# Patient Record
Sex: FEMALE | Race: WHITE | NOT HISPANIC OR LATINO | Employment: OTHER | URBAN - METROPOLITAN AREA
[De-identification: names, ages, dates, MRNs, and addresses within clinical notes are randomized per-mention and may not be internally consistent; named-entity substitution may affect disease eponyms.]

---

## 2019-07-25 ENCOUNTER — OFFICE VISIT (OUTPATIENT)
Dept: PODIATRY | Facility: CLINIC | Age: 59
End: 2019-07-25
Payer: COMMERCIAL

## 2019-07-25 DIAGNOSIS — M20.60 ACQUIRED DEFORMITY OF TOE, UNSPECIFIED LATERALITY: Primary | ICD-10-CM

## 2019-07-25 DIAGNOSIS — E11.9 CONTROLLED TYPE 2 DIABETES MELLITUS WITHOUT COMPLICATION, WITHOUT LONG-TERM CURRENT USE OF INSULIN: ICD-10-CM

## 2019-07-25 DIAGNOSIS — B35.1 ONYCHOMYCOSIS DUE TO DERMATOPHYTE: ICD-10-CM

## 2019-07-25 DIAGNOSIS — M79.675 PAIN IN TOES OF BOTH FEET: ICD-10-CM

## 2019-07-25 DIAGNOSIS — M79.674 PAIN IN TOES OF BOTH FEET: ICD-10-CM

## 2019-07-25 PROCEDURE — 99999 PR PBB SHADOW E&M-NEW PATIENT-LVL II: ICD-10-PCS | Mod: PBBFAC,,, | Performed by: PODIATRIST

## 2019-07-25 PROCEDURE — 99202 OFFICE O/P NEW SF 15 MIN: CPT | Mod: S$GLB,,, | Performed by: PODIATRIST

## 2019-07-25 PROCEDURE — 99202 PR OFFICE/OUTPT VISIT, NEW, LEVL II, 15-29 MIN: ICD-10-PCS | Mod: S$GLB,,, | Performed by: PODIATRIST

## 2019-07-25 PROCEDURE — 99999 PR PBB SHADOW E&M-NEW PATIENT-LVL II: CPT | Mod: PBBFAC,,, | Performed by: PODIATRIST

## 2019-07-25 RX ORDER — DICLOFENAC SODIUM 10 MG/G
2 GEL TOPICAL 3 TIMES DAILY
Qty: 100 G | Refills: 2 | Status: SHIPPED | OUTPATIENT
Start: 2019-07-25

## 2019-07-25 RX ORDER — GLIMEPIRIDE 2 MG/1
TABLET ORAL
COMMUNITY
Start: 2019-05-18

## 2019-07-25 RX ORDER — METFORMIN HYDROCHLORIDE 1000 MG/1
1000 TABLET ORAL 2 TIMES DAILY
Refills: 2 | COMMUNITY
Start: 2019-07-07

## 2019-07-27 VITALS — HEART RATE: 84 BPM | DIASTOLIC BLOOD PRESSURE: 72 MMHG | SYSTOLIC BLOOD PRESSURE: 111 MMHG

## 2019-07-27 NOTE — PROGRESS NOTES
Subjective:      Patient ID: Cuca Gilmore is a 59 y.o. female.    Chief Complaint: Nail Problem (arched toe nails, space between 2 & 3rd digit)  Patient presents with concern regarding space between 2nd and 3rd digits on both feet, pain on occasion and problems with nails on both feet.  States she is a type 2 diabetic with no complications.  No previous foot problems.  She has noticed discoloration and thickening of the 1st and 4th digit nails left foot and curvature of a few nails, 2nd toenail left foot most severe.  No previous history of infection.  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  Has been wearing wear open-toed shoes during the summer with the strap across the top that helps push the 2nd and 3rd digits together. Pain level       ROS  Constitutional    Pleasant, well-nourished, no distress, well oriented    Eyes         GLASSES    Cardiovascular          No chest pain, no shortness of breath,  no swelling in the extremities    Respiratory           No cough, no congestion     Musculoskeletal        No muscle aches, no arthralgias/joint pain    Endocrine          DIABETES        Objective:      Physical Exam   Cardiovascular:   Pulses:       Dorsalis pedis pulses are 2+ on the right side, and 2+ on the left side.        Posterior tibial pulses are 2+ on the right side, and 2+ on the left side.   Feet:   Right Foot:   Protective Sensation: 3 sites tested. 3 sites sensed.   Left Foot:   Protective Sensation: 3 sites tested. 3 sites sensed.   Vascular         Normal CFT bilateral   No lower extremity edema bilateral   Pedal skin temperature and color are normal bilateral     Integumentary        There is mild to moderate fungal involvement left hallux and 4th digit nail left foot, stable with no infection.  Sec digit nail left foot is curved, incurvated on medial lateral borders with no evidence of infection.  There is  curvature of multiple lesser digit nails with no pain             upon palpation at this time  Skin is in good condition bilateral feet  No skin breaks, bruises, abrasions bilateral          Neurological   Gross sensation intact bilateral feet.  No direct pain upon palpation of the 2nd webspace but with splayed digits discomfort in this area is consistent with mild neuroma, exasperated with activity    Musculoskeletal   Muscle Strength/Testing and Tone:  Intact, normal tone bilateral        Joints, Bones, and Muscles: Splayed 2nd and 3rd digits, tight extensor tendon leading to second digit, elonagted 2nd digit bilateral        Assessment:       Encounter Diagnoses   Name Primary?    Acquired deformity of toe, unspecified laterality Yes    Pain in toes of both feet     Onychomycosis due to dermatophyte     Controlled type 2 diabetes mellitus without complication, without long-term current use of insulin          Plan:       Cuca was seen today for nail problem.    Diagnoses and all orders for this visit:    Acquired deformity of toe, unspecified laterality    Pain in toes of both feet    Onychomycosis due to dermatophyte    Controlled type 2 diabetes mellitus without complication, without long-term current use of insulin    Other orders  -     diclofenac sodium (VOLTAREN) 1 % Gel; Apply 2 g topically 3 (three) times daily.      Reviewed elongated 2nd digits, tight extensor tendon, splayed the 2nd and 3rd digits   With patient.  Discussed potential complications regarding this area and we discussed neuroma.  Advised discomfort between these toes is mild inflammation of the nerve, resolves on its own since she wears appropriate shoes.  Explained the patient she needs to monitor for any burning or tingling in the area, if pain increases she needs to be seen for follow-up.  We discussed conservative treatments including a butterfly splint which splints the 2nd 3rd digit, recommended [email or web address].  Discussed  appropriate shoes, conservative treatments for inflammation including ice /cool therapy in frequency this should be performed and prescribed Voltaren gel, explained how to apply daily  Discussed maintenance of nails to prevent problems with curved nail plates, monitor for any infection/redness or swelling along the nail borders.  We discussed conservative treatments to start immediately should area become red, swollen and painful.  Reviewed better maintenance of fungal nails, reducing thickness, keeping the nail plates short N utilizing over-the-counter Vicks vapor rub, tea tree oil or a top rated topical nail antifungal product.  Whichever she chooses it needs to be applied daily for at least 3 months.  Explained the patient takes a full year for the nail plate to grow out, so after 3 months if topical medication is working and needs to be applied with some regularity for  12 months.Reviewed diabetic education, potential complications, symptoms of neuropathy to monitor for in her feet  Instructed patient to check feet daily, contact the office with any area which is become red, sore painful and not improved in 2-3 days.  Patient was in understanding and agreement with treatment plan.  Counseled the patient on her conditions, their implications and medical management.  Instructed patient to contact the office with any changes, questions, concerns, worsening of symptoms. Patient verbalized understanding.   Total face to face time, exam, assessment, treatment, discussion, documentation 20 minutes, more than half this time spent on consultation and coordination of care.   Follow up as needed        This note was created using M*Modal voice recognition software that occasionally misinterpreted phrases or words.

## 2023-06-04 ENCOUNTER — OFFICE VISIT (OUTPATIENT)
Dept: URGENT CARE | Facility: CLINIC | Age: 63
End: 2023-06-04
Payer: COMMERCIAL

## 2023-06-04 VITALS
WEIGHT: 210 LBS | OXYGEN SATURATION: 97 % | HEIGHT: 67 IN | HEART RATE: 74 BPM | SYSTOLIC BLOOD PRESSURE: 124 MMHG | DIASTOLIC BLOOD PRESSURE: 82 MMHG | TEMPERATURE: 99 F | BODY MASS INDEX: 32.96 KG/M2

## 2023-06-04 DIAGNOSIS — R09.81 NASAL CONGESTION: Primary | ICD-10-CM

## 2023-06-04 DIAGNOSIS — J34.89 NASAL DRAINAGE: ICD-10-CM

## 2023-06-04 LAB
CTP QC/QA: YES
SARS-COV-2 AG RESP QL IA.RAPID: NEGATIVE

## 2023-06-04 PROCEDURE — 99203 PR OFFICE/OUTPT VISIT, NEW, LEVL III, 30-44 MIN: ICD-10-PCS | Mod: ,,,

## 2023-06-04 PROCEDURE — 87811 SARS-COV-2 COVID19 W/OPTIC: CPT | Mod: QW,,,

## 2023-06-04 PROCEDURE — 99203 OFFICE O/P NEW LOW 30 MIN: CPT | Mod: ,,,

## 2023-06-04 PROCEDURE — 87811 SARS CORONAVIRUS 2 ANTIGEN POCT, MANUAL READ: ICD-10-PCS | Mod: QW,,,

## 2023-06-04 NOTE — PROGRESS NOTES
"Subjective:       Patient ID: Cuca Gilmore is a 63 y.o. female.    Vitals:  height is 5' 7" (1.702 m) and weight is 95.3 kg (210 lb). Her oral temperature is 98.7 °F (37.1 °C). Her blood pressure is 124/82 and her pulse is 74. Her oxygen saturation is 97%.     Chief Complaint: Nasal Congestion (Nasal congestion which began last night.   Exposed to Family Member who tested positive for Covid last week. )    Patient has nasal congestion which started yesterday.   She has a family member which tested positive for COVID last week.  No cough  No fever.  Patient is requesting a COVID test today. Patient states that her  was diagnosed with it last week.       Constitution: Negative.   HENT:  Positive for congestion.    Neck: neck negative.   Cardiovascular: Negative.    Eyes: Negative.    Respiratory: Negative.     Gastrointestinal: Negative.    Endocrine: negative.   Genitourinary: Negative.    Musculoskeletal: Negative.    Skin: Negative.    Allergic/Immunologic: Negative.    Neurological: Negative.    Hematologic/Lymphatic: Negative.    Psychiatric/Behavioral: Negative.           Objective:      Physical Exam   Constitutional: She is oriented to person, place, and time. obesity  HENT:   Head: Normocephalic and atraumatic.   Ears:   Right Ear: Tympanic membrane, external ear and ear canal normal.   Left Ear: Tympanic membrane, external ear and ear canal normal.   Nose: Congestion present.   Mouth/Throat: Mucous membranes are moist. Posterior oropharyngeal erythema present.   Eyes: Conjunctivae are normal. Pupils are equal, round, and reactive to light. Extraocular movement intact   Neck: Neck supple.   Cardiovascular: Normal rate, regular rhythm, normal heart sounds and normal pulses.   Pulmonary/Chest: Effort normal and breath sounds normal.   Abdominal: Normal appearance and bowel sounds are normal. Soft.   Musculoskeletal: Normal range of motion.         General: Normal range of motion. "   Neurological: no focal deficit. She is alert and oriented to person, place, and time.   Skin: Skin is warm. Capillary refill takes less than 2 seconds.   Psychiatric: Her behavior is normal. Mood, judgment and thought content normal.   Nursing note and vitals reviewed.      Past medical history and current medications reviewed.       Assessment:     Results for orders placed or performed in visit on 06/04/23   SARS Coronavirus 2 Antigen, POCT Manual Read   Result Value Ref Range    SARS Coronavirus 2 Antigen Negative Negative     Acceptable Yes              1. Nasal congestion              Plan:         Nasal congestion  -     SARS Coronavirus 2 Antigen, POCT Manual Read             There are no Patient Instructions on file for this visit.

## 2023-08-12 NOTE — PROGRESS NOTES
"[facility] Note    PCP: Tawny Woods MD    Chief Complaint: incontinence    SUBJECTIVE:       History of Present Illness:  Cuca Gilmore is a 63 y.o. female who presents to clinic for incontinence. She is New  to our clinic.     Today leaking if she coughs or sneezing if her bladder is full.   She does have urgency if she waits too long.   She was having frequency however decreased fluids.     Drinks diet coke every morning, water during the day. Rare iced tea.     No vaginal pressure or bulging.     No issues with constipation.     Not wearing pads.  4 vaginal deliveries.     UA today: negative   PVR today: 57 cc    Last urine culture: no documented UTIs    Hx of DM    Past medical, family, and social history reviewed as documented in chart with pertinent positive medical, family, and social history detailed in HPI.    Review of patient's allergies indicates:  No Known Allergies    Past Medical History:   Diagnosis Date    Diabetes mellitus, type 2      No past surgical history on file.  No family history on file.  Social History     Tobacco Use    Smoking status: Never    Smokeless tobacco: Never   Substance Use Topics    Alcohol use: Yes     Comment: 1-2 times a month    Drug use: Never        Review of Systems    OBJECTIVE:     Anticoagulation:  no    Estimated body mass index is 32.91 kg/m² as calculated from the following:    Height as of this encounter: 5' 7" (1.702 m).    Weight as of this encounter: 95.3 kg (210 lb 1.6 oz).    Vital Signs (Most Recent)  Pulse: 80 (08/14/23 0917)  BP: 131/76 (08/14/23 0917)    Physical Exam  Vitals reviewed.   Constitutional:       General: She is not in acute distress.     Appearance: Normal appearance. She is not ill-appearing.   HENT:      Head: Normocephalic and atraumatic.   Eyes:      General: No scleral icterus.  Cardiovascular:      Rate and Rhythm: Normal rate and regular rhythm.   Pulmonary:      Effort: Pulmonary effort is " normal. No respiratory distress.   Abdominal:      Palpations: Abdomen is soft.   Skin:     Coloration: Skin is not jaundiced.   Neurological:      General: No focal deficit present.      Mental Status: She is alert and oriented to person, place, and time.   Psychiatric:         Mood and Affect: Mood normal.         Behavior: Behavior normal.         Imaging:  No pertinent recent imaging available.      ASSESSMENT     1. Mixed stress and urge urinary incontinence      PLAN:     - Patient has mixed incontinence   - Currently, she is not overly bothered by either to want to start medication or undergo surgery  - For her MAGALI, I recommended Kegel exercises 10 reps 10 x per day, can consider PT if this is not effective  - Discussed conservative measures to control urgency and frequency including   1. Avoiding/minimizing bladder irritants (see below), especially in afternoon and evening hours Discussed bladder irritants include coffe (even decaf), tea, alcohol, soda, spicy foods, acidic juices (orange, tomato), vinegar, and artificial sweeteners/sugary beverages.   2. timed voiding - empty on a schedule (approx ~2-3 hours) in spite of need to urinate, to get ahead of urge   3. dont postponing voiding - dont hold it on purpose   4. INCREASE water intake   5. Stop fluids 2 hours before bed, and urinate just before bed  - She is worried about potential vaginal prolapse, her mother and grandmother are effected by this, has no symptoms but would like to be evaluated for potential preventative measures. Referral placed to Urogyn.   - Can follow up with me JOSE Allison MD

## 2023-08-14 ENCOUNTER — OFFICE VISIT (OUTPATIENT)
Dept: UROLOGY | Facility: CLINIC | Age: 63
End: 2023-08-14
Payer: COMMERCIAL

## 2023-08-14 VITALS
BODY MASS INDEX: 32.98 KG/M2 | DIASTOLIC BLOOD PRESSURE: 76 MMHG | SYSTOLIC BLOOD PRESSURE: 131 MMHG | HEIGHT: 67 IN | HEART RATE: 80 BPM | WEIGHT: 210.13 LBS

## 2023-08-14 DIAGNOSIS — N39.46 MIXED STRESS AND URGE URINARY INCONTINENCE: Primary | ICD-10-CM

## 2023-08-14 LAB
BILIRUBIN, UA POC OHS: NEGATIVE
BLOOD, UA POC OHS: NEGATIVE
CLARITY, UA POC OHS: CLEAR
COLOR, UA POC OHS: YELLOW
GLUCOSE, UA POC OHS: NEGATIVE
KETONES, UA POC OHS: NEGATIVE
LEUKOCYTES, UA POC OHS: NEGATIVE
NITRITE, UA POC OHS: NEGATIVE
PH, UA POC OHS: 6
PROTEIN, UA POC OHS: NEGATIVE
SPECIFIC GRAVITY, UA POC OHS: >=1.03
UROBILINOGEN, UA POC OHS: 1

## 2023-08-14 PROCEDURE — 1159F MED LIST DOCD IN RCRD: CPT | Mod: CPTII,S$GLB,, | Performed by: STUDENT IN AN ORGANIZED HEALTH CARE EDUCATION/TRAINING PROGRAM

## 2023-08-14 PROCEDURE — 3078F DIAST BP <80 MM HG: CPT | Mod: CPTII,S$GLB,, | Performed by: STUDENT IN AN ORGANIZED HEALTH CARE EDUCATION/TRAINING PROGRAM

## 2023-08-14 PROCEDURE — 99999 PR PBB SHADOW E&M-EST. PATIENT-LVL III: ICD-10-PCS | Mod: PBBFAC,,, | Performed by: STUDENT IN AN ORGANIZED HEALTH CARE EDUCATION/TRAINING PROGRAM

## 2023-08-14 PROCEDURE — 1159F PR MEDICATION LIST DOCUMENTED IN MEDICAL RECORD: ICD-10-PCS | Mod: CPTII,S$GLB,, | Performed by: STUDENT IN AN ORGANIZED HEALTH CARE EDUCATION/TRAINING PROGRAM

## 2023-08-14 PROCEDURE — 3008F PR BODY MASS INDEX (BMI) DOCUMENTED: ICD-10-PCS | Mod: CPTII,S$GLB,, | Performed by: STUDENT IN AN ORGANIZED HEALTH CARE EDUCATION/TRAINING PROGRAM

## 2023-08-14 PROCEDURE — 3008F BODY MASS INDEX DOCD: CPT | Mod: CPTII,S$GLB,, | Performed by: STUDENT IN AN ORGANIZED HEALTH CARE EDUCATION/TRAINING PROGRAM

## 2023-08-14 PROCEDURE — 3075F SYST BP GE 130 - 139MM HG: CPT | Mod: CPTII,S$GLB,, | Performed by: STUDENT IN AN ORGANIZED HEALTH CARE EDUCATION/TRAINING PROGRAM

## 2023-08-14 PROCEDURE — 99999 PR PBB SHADOW E&M-EST. PATIENT-LVL III: CPT | Mod: PBBFAC,,, | Performed by: STUDENT IN AN ORGANIZED HEALTH CARE EDUCATION/TRAINING PROGRAM

## 2023-08-14 PROCEDURE — 81003 POCT URINALYSIS(INSTRUMENT): ICD-10-PCS | Mod: QW,S$GLB,, | Performed by: STUDENT IN AN ORGANIZED HEALTH CARE EDUCATION/TRAINING PROGRAM

## 2023-08-14 PROCEDURE — 99204 OFFICE O/P NEW MOD 45 MIN: CPT | Mod: S$GLB,,, | Performed by: STUDENT IN AN ORGANIZED HEALTH CARE EDUCATION/TRAINING PROGRAM

## 2023-08-14 PROCEDURE — 99204 PR OFFICE/OUTPT VISIT, NEW, LEVL IV, 45-59 MIN: ICD-10-PCS | Mod: S$GLB,,, | Performed by: STUDENT IN AN ORGANIZED HEALTH CARE EDUCATION/TRAINING PROGRAM

## 2023-08-14 PROCEDURE — 3078F PR MOST RECENT DIASTOLIC BLOOD PRESSURE < 80 MM HG: ICD-10-PCS | Mod: CPTII,S$GLB,, | Performed by: STUDENT IN AN ORGANIZED HEALTH CARE EDUCATION/TRAINING PROGRAM

## 2023-08-14 PROCEDURE — 3075F PR MOST RECENT SYSTOLIC BLOOD PRESS GE 130-139MM HG: ICD-10-PCS | Mod: CPTII,S$GLB,, | Performed by: STUDENT IN AN ORGANIZED HEALTH CARE EDUCATION/TRAINING PROGRAM

## 2023-08-14 PROCEDURE — 81003 URINALYSIS AUTO W/O SCOPE: CPT | Mod: QW,S$GLB,, | Performed by: STUDENT IN AN ORGANIZED HEALTH CARE EDUCATION/TRAINING PROGRAM

## 2023-08-15 ENCOUNTER — TELEPHONE (OUTPATIENT)
Dept: FAMILY MEDICINE | Facility: CLINIC | Age: 63
End: 2023-08-15
Payer: COMMERCIAL

## 2023-08-22 ENCOUNTER — TELEPHONE (OUTPATIENT)
Dept: UROGYNECOLOGY | Facility: CLINIC | Age: 63
End: 2023-08-22
Payer: COMMERCIAL

## 2023-08-22 NOTE — TELEPHONE ENCOUNTER
----- Message from Shania Sung LPN sent at 8/14/2023  9:51 AM CDT -----  Regarding: prolapse schedule with Rocio Morgan LPN [place], Ana M, LPN  Caller: Unspecified (Today,  9:38 AM)  Patient seen in Urology today, referral for appt for prolapse. Please contact for appt, thank you.

## 2023-08-29 NOTE — TELEPHONE ENCOUNTER
Clinical staff please print [facility] order and have  sign and fax to   [facility]  [address] [address]  Phone: [phone]   Fax: [phone]     Please send me message back when completed so I can let patient know it is ok to schedule.   My pool ([phone])   Spoke to patient and scheduled with Dr Higgins.

## 2023-09-20 ENCOUNTER — OFFICE VISIT (OUTPATIENT)
Dept: UROGYNECOLOGY | Facility: CLINIC | Age: 63
End: 2023-09-20
Payer: COMMERCIAL

## 2023-09-20 VITALS — WEIGHT: 210.13 LBS | HEIGHT: 67 IN | BODY MASS INDEX: 32.98 KG/M2

## 2023-09-20 DIAGNOSIS — N81.2 CYSTOCELE AND RECTOCELE WITH INCOMPLETE UTEROVAGINAL PROLAPSE: Primary | ICD-10-CM

## 2023-09-20 PROCEDURE — 3008F PR BODY MASS INDEX (BMI) DOCUMENTED: ICD-10-PCS | Mod: CPTII,S$GLB,, | Performed by: OBSTETRICS & GYNECOLOGY

## 2023-09-20 PROCEDURE — 99999 PR PBB SHADOW E&M-EST. PATIENT-LVL II: CPT | Mod: PBBFAC,,, | Performed by: OBSTETRICS & GYNECOLOGY

## 2023-09-20 PROCEDURE — 1159F MED LIST DOCD IN RCRD: CPT | Mod: CPTII,S$GLB,, | Performed by: OBSTETRICS & GYNECOLOGY

## 2023-09-20 PROCEDURE — 99204 PR OFFICE/OUTPT VISIT, NEW, LEVL IV, 45-59 MIN: ICD-10-PCS | Mod: 25,S$GLB,, | Performed by: OBSTETRICS & GYNECOLOGY

## 2023-09-20 PROCEDURE — 51701 PR INSERTION OF NON-INDWELLING BLADDER CATHETERIZATION FOR RESIDUAL UR: ICD-10-PCS | Mod: S$GLB,,, | Performed by: OBSTETRICS & GYNECOLOGY

## 2023-09-20 PROCEDURE — 3008F BODY MASS INDEX DOCD: CPT | Mod: CPTII,S$GLB,, | Performed by: OBSTETRICS & GYNECOLOGY

## 2023-09-20 PROCEDURE — 99999 PR PBB SHADOW E&M-EST. PATIENT-LVL II: ICD-10-PCS | Mod: PBBFAC,,, | Performed by: OBSTETRICS & GYNECOLOGY

## 2023-09-20 PROCEDURE — 99204 OFFICE O/P NEW MOD 45 MIN: CPT | Mod: 25,S$GLB,, | Performed by: OBSTETRICS & GYNECOLOGY

## 2023-09-20 PROCEDURE — 1159F PR MEDICATION LIST DOCUMENTED IN MEDICAL RECORD: ICD-10-PCS | Mod: CPTII,S$GLB,, | Performed by: OBSTETRICS & GYNECOLOGY

## 2023-09-20 PROCEDURE — 51701 INSERT BLADDER CATHETER: CPT | Mod: S$GLB,,, | Performed by: OBSTETRICS & GYNECOLOGY

## 2023-09-20 NOTE — PROGRESS NOTES
"Subjective:      Patient ID: Cuca Gilmore is a 63 y.o. female.    Chief Complaint:  Vaginal Prolapse      History of Present Illness  63-year-old multipara presents with strong history of anatomic distortion additionally she wants an evaluation for her level of prolapse from there she all she wishes to discuss her stress urinary incontinence which is not particularly bothersome there is no issues of urgency or frequency patient believes she is emptying her bladder appropriately            Past Medical History:   Diagnosis Date    Diabetes mellitus, type 2        No past surgical history on file.    GYN & OB History  No LMP recorded. Patient is postmenopausal.   Date of Last Pap: No result found    OB History   No obstetric history on file.       Health Maintenance         Date Due Completion Date    Hepatitis C Screening Never done ---    Cervical Cancer Screening Never done ---    Lipid Panel Never done ---    HIV Screening Never done ---    TETANUS VACCINE Never done ---    Mammogram Never done ---    Hemoglobin A1c (Diabetic Prevention Screening) Never done ---    Colorectal Cancer Screening Never done ---    Shingles Vaccine (1 of 2) Never done ---    COVID-19 Vaccine (3 - Moderna series) 05/24/2021 3/29/2021    Influenza Vaccine (1) 09/01/2023 11/12/2020            No family history on file.    Social History     Socioeconomic History    Marital status:    Tobacco Use    Smoking status: Never    Smokeless tobacco: Never   Substance and Sexual Activity    Alcohol use: Yes     Comment: 1-2 times a month    Drug use: Never    Sexual activity: Yes       Review of Systems  Review of Systems  Would like evaluation for pelvic organ prolapse     Objective:   Ht 5' 7" (1.702 m)   Wt 95.3 kg (210 lb 1.6 oz)   BMI 32.91 kg/m²     Physical Exam   Stage III anterior compartment defect with apical involvement stage II posterior stage II defect.    Given the large volume defect I went ahead and straight " catheterization patient she would voided earlier we did not capture the amount but I did want to see a postvoid residual CIC through prepped meatus returned a value about 50 cc.      Assessment:     1. Cystocele and rectocele with incomplete uterovaginal prolapse            Plan:     1. Cystocele and rectocele with incomplete uterovaginal prolapse      Long discussion patient regarding my findings.    We started with a presentation of normal anatomy then we compare that with her anatomy from there we we discussed options we discussed surgical intervention we had a nice discussion on graphs patient is an active  is actively taking care with several month year old as well as a 2-year-old would like to wait until summer she was reminded that there is nothing emergent about this she can take all the time she needs for precise planning patient will let us know what direction she wants wishes to go information provided in the after visit summary.      Patient Instructions                   Expectant Management:  Patient understands we will continue to monitor her level of anatomic distortion without any type of active intervention until a time where symptomatology can no longer be tolerated by patient and she wishes to have active management.    Conservative Therapy:  Patient understands she will be utilizing a a supportive device within the vagina which will need maintenance.  The clinic will support her in her maintenance of the pessary.  Patient understands this is an active process which will need her input to maintain the pessary properly.     Surgical Management:

## 2024-01-23 ENCOUNTER — TELEPHONE (OUTPATIENT)
Dept: UROGYNECOLOGY | Facility: CLINIC | Age: 64
End: 2024-01-23
Payer: COMMERCIAL

## 2024-01-23 NOTE — TELEPHONE ENCOUNTER
States she received a letter stating DR Higgins is leaving, informed this was true , asked if staying with [facility], informed he will be leaving for [place]. Informed there will be two new Urogyn physicians who will be coming to  [place]. Asked to be called when they do come in, states she is looking for a summer surgery. Placed on list to call.

## 2024-01-23 NOTE — TELEPHONE ENCOUNTER
----- Message from Kate Morrow sent at 1/23/2024  2:22 PM CST -----  Type:  Needs Medical Advice    Who Called: pt   Best Call Back Number: [phone] (home)     Additional Information:  Requesting call back  wants to discuss  sx     please advise thank you

## 2024-05-14 ENCOUNTER — OFFICE VISIT (OUTPATIENT)
Dept: UROGYNECOLOGY | Facility: CLINIC | Age: 64
End: 2024-05-14
Payer: COMMERCIAL

## 2024-05-14 VITALS
HEIGHT: 67 IN | BODY MASS INDEX: 32.98 KG/M2 | DIASTOLIC BLOOD PRESSURE: 83 MMHG | WEIGHT: 210.13 LBS | SYSTOLIC BLOOD PRESSURE: 140 MMHG | HEART RATE: 67 BPM

## 2024-05-14 DIAGNOSIS — N81.6 POSTERIOR VAGINAL WALL PROLAPSE: ICD-10-CM

## 2024-05-14 DIAGNOSIS — N81.10 PROLAPSE OF ANTERIOR VAGINAL WALL: ICD-10-CM

## 2024-05-14 DIAGNOSIS — N81.2 CYSTOCELE AND RECTOCELE WITH INCOMPLETE UTEROVAGINAL PROLAPSE: ICD-10-CM

## 2024-05-14 DIAGNOSIS — N81.2 UTEROVAGINAL PROLAPSE, INCOMPLETE: Primary | ICD-10-CM

## 2024-05-14 LAB
BILIRUBIN, UA POC OHS: NEGATIVE
BLOOD, UA POC OHS: NEGATIVE
CLARITY, UA POC OHS: CLEAR
COLOR, UA POC OHS: YELLOW
GLUCOSE, UA POC OHS: NEGATIVE
KETONES, UA POC OHS: NEGATIVE
LEUKOCYTES, UA POC OHS: NEGATIVE
NITRITE, UA POC OHS: NEGATIVE
PH, UA POC OHS: 7
PROTEIN, UA POC OHS: NEGATIVE
SPECIFIC GRAVITY, UA POC OHS: 1.01
UROBILINOGEN, UA POC OHS: 0.2

## 2024-05-14 PROCEDURE — 99999 PR PBB SHADOW E&M-EST. PATIENT-LVL IV: CPT | Mod: PBBFAC,,, | Performed by: OBSTETRICS & GYNECOLOGY

## 2024-05-14 PROCEDURE — 1159F MED LIST DOCD IN RCRD: CPT | Mod: CPTII,S$GLB,, | Performed by: OBSTETRICS & GYNECOLOGY

## 2024-05-14 PROCEDURE — 3077F SYST BP >= 140 MM HG: CPT | Mod: CPTII,S$GLB,, | Performed by: OBSTETRICS & GYNECOLOGY

## 2024-05-14 PROCEDURE — 99215 OFFICE O/P EST HI 40 MIN: CPT | Mod: S$GLB,,, | Performed by: OBSTETRICS & GYNECOLOGY

## 2024-05-14 PROCEDURE — 3008F BODY MASS INDEX DOCD: CPT | Mod: CPTII,S$GLB,, | Performed by: OBSTETRICS & GYNECOLOGY

## 2024-05-14 PROCEDURE — 3079F DIAST BP 80-89 MM HG: CPT | Mod: CPTII,S$GLB,, | Performed by: OBSTETRICS & GYNECOLOGY

## 2024-05-14 PROCEDURE — 81003 URINALYSIS AUTO W/O SCOPE: CPT | Mod: QW,S$GLB,, | Performed by: OBSTETRICS & GYNECOLOGY

## 2024-05-14 RX ORDER — SOLIFENACIN SUCCINATE 5 MG/1
5 TABLET, FILM COATED ORAL DAILY
Qty: 30 TABLET | Refills: 11 | Status: SHIPPED | OUTPATIENT
Start: 2024-05-14 | End: 2025-05-14

## 2024-05-14 RX ORDER — ESTRADIOL 0.1 MG/G
0.5 CREAM VAGINAL
Qty: 45 G | Refills: 4 | Status: SHIPPED | OUTPATIENT
Start: 2024-05-16

## 2024-05-14 NOTE — PROGRESS NOTES
Subjective:       Patient ID: Cuca Gilmore is a 64 y.o. female.    Chief Complaint:  Vaginal Prolapse    History of Present Illness  HPI 64 y.o.  female No obstetric history on file.   has a past medical history of Diabetes mellitus, type 2.  Patient previously seen by Dr. Higgins with a plan on SCP in the summer. She has changed her plans and wants to see if she can wait for surgery next year.       [facility]    Question 5/13/2024 10:07 AM CDT - Filed by Shania Sung LPN   General Urogynecology: Are you experiencing the following?    Dysuria (painful urination) No   Nocturia:  waking up at night to empty your bladder Yes   If you answered yes to the previous question, how many times does this happen per night? 1-2   Enuresis (urine loss during sleep) No   Dribbling urine after you urinate No   Hematuria (urine appears red) No   Type of stream Interrupted   Urinary frequency: How often a day are you going to the bathroom per day? Less than 10   Urinary Tract Infections: How many Urinary Tract Infections have you had in the past year? I have not had a UTI in the past year   If you have had a UTI in the past year, what treatments have you had so far? I have not had a UTI in the past year   Urinary Incontinence (General): Are you experiencing the following?    Past consultation for incontinence: Have you ever seen someone for the evaluation of incontinence? Yes   If you answered yes to the previous question, please select all the therapies you have tried. None of the above   Please note the effectiveness of the therapies. Ineffective   Need to wear protection to keep clothes dry No   If you answered yes to the previous question, please gisele the protection you use. N/a- I answered no to the previous question   If you wear protection, how much wetness is typically on each pad? N/a- I do not wear protection   If you wear protection, how often do you have to change per day, if applicable? 0   Stress  "Symptoms: Are you experiencing the following?    Leakage of urine with cough, laugh and/or sneeze Yes   If you answered yes to the previous question, what is the frequency in days, weeks and/or months? Daily   Leakage of urine with sex Yes   Leakage of urine with bending/ lifting No   Leakage of urine with briskly walking or jogging No   If you lose urine for any other reason not previously mentioned, please note it below, if applicable.    Urge Symptoms: Are you experiencing the following?    Urgency ("got to go" feeling) Yes   Urge: How frequently do you feel an urge to urinate (feeling like you "gotta go" to the bathroom and can't wait) Daily   Do you experience a leakage of urine when you have a feeling of urgency? Yes   Leakage of urine when unaware No   Past use of anticholinergics (medications used to treat overactive bladder) No   If you answered yes to the previous question, please gisele the anticholinergics you have used:    Have you ever used Mirbetriq (aka Mirabegron)? No   Prolapse Symptoms: Are you experiencing any of the following?    Falling out/ Bulging/ Heaviness in the vagina No   Vaginal/ Abdominal Pain/ Pressure No   Need to strain/ Push to void No   Need to wait on the toilet before you void Yes   Unusual position to urinate (using your hands to push back the vaginal bulge) No   Sensation of incomplete emptying Yes   Past use of pessary device No   If you answered yes to the previous question, please list the devices you have used below.    Bowel Symptoms: Are you experiencing any of the following?    Constipation No   Diarrhea Yes   Hematochezia (bloody stool) No   Incomplete evacuation of stool No   Involuntary loss of formed stool No   Fecal smearing/urgency No   Involuntary loss of gas No   Vaginal Symptoms: Are you experiencing any of the following?    Abnormal vaginal bleeding No   Vaginal dryness No   Sexually active Yes   Dyspareunia (painful intercourse) No   Estrogen use No     GYN & " OB History  No LMP recorded. Patient is postmenopausal.   Date of Last Pap: No result found    OB History   No obstetric history on file.     OB   x 4.  C/s x 0.    Largest: 9 # 10 oz   Forceps: No  Episiotomy:  yes  Degree: 3rd or 4th unknown    GYN  Menarche:  17  Menstrual cycle: n/a  Menopause:  50's  Hysterectomy:  No  Ovaries:  present   Tubal ligation: No   Other abdominal surgeries: Danica ( robotic)      Past Medical History:   Diagnosis Date    Diabetes mellitus, type 2      No past surgical history on file.    Review of Systems  Review of Systems   Constitutional: Negative.  Negative for activity change, appetite change, chills, diaphoresis, fatigue, fever and unexpected weight change.   HENT: Negative.     Eyes: Negative.    Respiratory: Negative.  Negative for cough.    Cardiovascular: Negative.    Gastrointestinal:  Positive for constipation. Negative for abdominal distention, abdominal pain, anal bleeding, blood in stool, diarrhea, nausea, rectal pain and vomiting.   Endocrine: Negative.    Genitourinary:  Positive for difficulty urinating. Negative for decreased urine volume, dyspareunia, dysuria, enuresis, flank pain, frequency, genital sores, hematuria, menstrual problem, pelvic pain, urgency, vaginal bleeding, vaginal discharge and vaginal pain.        Prolapse  + vaginal bulge   +vaginal pressure    Musculoskeletal:  Negative for back pain.   Skin:  Negative for color change, pallor, rash and wound.   Allergic/Immunologic: Negative for environmental allergies, food allergies and immunocompromised state.   Hematological:  Negative for adenopathy. Does not bruise/bleed easily.   Psychiatric/Behavioral:  Negative for agitation, behavioral problems, confusion and sleep disturbance.            Objective:     Physical Exam   Constitutional: She is oriented to person, place, and time. She appears well-developed.   HENT:   Head: Normocephalic and atraumatic.   Eyes: Conjunctivae and EOM are normal.    Cardiovascular: Normal rate, regular rhythm, S1 normal, S2 normal, normal heart sounds and intact distal pulses.   Pulmonary/Chest: Effort normal and breath sounds normal. She exhibits no tenderness.   Abdominal: Soft. Bowel sounds are normal. She exhibits no distension and no mass. There is no splenomegaly or hepatomegaly. There is no abdominal tenderness. There is no rigidity, no rebound and no guarding. No hernia.   Genitourinary: Pelvic exam was performed with patient supine. Rectum normal, vagina normal, uterus normal, cervix normal, skenes normal and bartholins normal. Right labia normal and left labia normal. Urethra exhibits hypermobility. Urethra exhibits no urethral caruncle, no urethral diverticulum and no urethral mass. Right bartholin is not enlarged and not tender. Left bartholin is not enlarged and not tender. Rectal exam shows resting tone normal and active tone normal. Rectal exam shows no external hemorrhoid, no fissure, no tenderness, anal tone normal and no dovetailing. Guaiac negative stool. There is unspecified prolapse of vaginal walls and atrophy in the vagina. No foreign body, tenderness, bleeding, fistula, mesh exposure or lavator tenderness in the vagina. Right adnexum displays no mass and no tenderness. Left adnexum displays no mass and no tenderness. Cervix exhibits no motion tenderness and no discharge. Uterus is not tender and not experiencing uterine prolapse.   PVR: 70 ML  Empty cough stress test: Negative.  Kegel: 1/5    POP-Q  Aa: 1 Ba: 1 C: 0   GH: 4 PB: 2 TVL: 10   Ap: 0 Bp: 0 D: -3                     Musculoskeletal:         General: Normal range of motion.      Cervical back: Normal range of motion and neck supple.   Neurological: She is alert and oriented to person, place, and time. She has normal strength, normal reflexes and intact cranial nerves (2-12). Cranial nerves II through XII intact. No cranial nerve deficit.   Skin: Skin is warm and dry.   Psychiatric: She has a  normal mood and affect. Her speech is normal and behavior is normal. Judgment normal.               HCM  Pap's: Normal  last Pap: 2023    Mammo: BIRADS: 1 ; Last imaging  2023   Colonoscopy: 2019- normal   Dexa:  n/a        Assessment:        1. Uterovaginal prolapse, incomplete    2. Cystocele and rectocele with incomplete uterovaginal prolapse    3. Prolapse of anterior vaginal wall    4. Posterior vaginal wall prolapse                Plan:    1. Prolapse: Stage 2 apical prolapse, Stage 2 anterior and posterior vaginal wall prolapse   --Pessary benefit discussed with patient, will schedule for pessary fitting   --Daily pelvic floor exercises as assigned, Pelvic floor PT   --Non surgical options discussed with patient    --Surgical options discussed such as :  with apical suspension: SSF, USLS and SCP with mesh augmentation.  The possible need for an anterior or posterior colporrhaphy was discussed.  We discussed pro's and con's of a native tissue repair vs an augmented repair with mesh. Risks/ benefits/ alternatives/ succuss and failure rates were reviewed with the various surgical treatment options. Information packets were given detailing surgical options.  She was also given web site information for: www.augs.org and [email or web address] and http://www.fda.gov/MedicalDevices/UroGynSurgicalMesh/default.htm to review the details of the surgical options discussed and the use of mesh in surgery. She will review the information given and we will discuss further at the follow up visit. I recommend a LAITH/SCP as the patient is very active. While it's unlikely that holding off until next year will cause an issue for her,  I do recommend trial the pessary in hopes to decrease progression of the prolapse.     2.  Mixed urinary incontinence, urge > stress:   --Bladder diary for 3-7 days, write the number of times you go to the rest room and associated symptoms of urgency or leakage.   --Empty bladder every 3 hours.  Empty  well: wait a minute, lean forward on toilet.    --Avoid dietary irritants (see sheet).  Keep diary x 3-5 days to determine your irritants.  --KEGELS: do 10 in AM and 10 in PM, holding each x 10 seconds.  When you feel urge to go, STOP, KEGEL, and when urge has passed, then go to bathroom.  Start pelvic floor PT.     --URGE:Vesicare 5 mg daily.  SE profile reviewed.    Takes 2-4 weeks to see if will have effect.  For dry mouth: get sour, sugar free lozenge or gum.    --STRESS:  Non surgical options: Pessary vs. Impressa vs Rivive - which represent urethral and bladder support devices; Surgical options including 1.  mid urethral sling; retropubic, transobturator vs single incision 2. Fascial slings 3. Mason procedure 4. Periurethral bulking     3. Vaginal atrophy (dryness):  Use 1 gram of estrogen cream in vagina nightly x 2 weeks, then twice a week thereafter.      Approximately 40 minutes of total time spent in consult, 75 % in discussion. This includes face to face time and non-face to face time preparing to see the patient, obtaining and/or reviewing separately obtained history, documenting clinical information in the electronic or other health record, independently interpreting results (not separately reported) and communicating results to the patient/family/caregiver, or care coordination (not separately reported).      Thank you for requesting consultation of your patient.  I look forward to participating in her care.    Elio Fields DO  Female Pelvic Medicine and Reconstructive Surgery  [facility] [place], [place]

## 2024-05-14 NOTE — PATIENT INSTRUCTIONS
1. Prolapse: Stage 2 apical prolapse, Stage 2 anterior and posterior vaginal wall prolapse   --Pessary benefit discussed with patient, will schedule for pessary fitting   --Daily pelvic floor exercises as assigned, Pelvic floor PT   --Non surgical options discussed with patient    --Surgical options discussed such as :  with apical suspension: SSF, USLS and SCP with mesh augmentation.  The possible need for an anterior or posterior colporrhaphy was discussed.  We discussed pro's and con's of a native tissue repair vs an augmented repair with mesh. Risks/ benefits/ alternatives/ succuss and failure rates were reviewed with the various surgical treatment options. Information packets were given detailing surgical options.  She was also given web site information for: www.augs.org and www.[employer].org and http://www.fda.gov/MedicalDevices/UroGynSurgicalMesh/default.htm to review the details of the surgical options discussed and the use of mesh in surgery. She will review the information given and we will discuss further at the follow up visit. I recommend a SCP as the patient is very active. Patient wants to hold off until next summer for surgery.      2.  Mixed urinary incontinence, urge > stress:   --Bladder diary for 3-7 days, write the number of times you go to the rest room and associated symptoms of urgency or leakage.   --Empty bladder every 3 hours.  Empty well: wait a minute, lean forward on toilet.    --Avoid dietary irritants (see sheet).  Keep diary x 3-5 days to determine your irritants.  --KEGELS: do 10 in AM and 10 in PM, holding each x 10 seconds.  When you feel urge to go, STOP, KEGEL, and when urge has passed, then go to bathroom.  Start pelvic floor PT.     --URGE: vesicare 5 mg daily.  SE profile reviewed.    Takes 2-4 weeks to see if will have effect.  For dry mouth: get sour, sugar free lozenge or gum.    --STRESS:  Non surgical options: Pessary vs. Impressa vs Rivive - which represent urethral  and bladder support devices; Surgical options including 1.  mid urethral sling; retropubic, transobturator vs single incision 2. Fascial slings 3. Mason procedure 4. Periurethral bulking      3. Vaginal atrophy (dryness):  Use 1 gram of estrogen cream in vagina nightly x 2 weeks, then twice a week thereafter.